# Patient Record
Sex: FEMALE | NOT HISPANIC OR LATINO | URBAN - METROPOLITAN AREA
[De-identification: names, ages, dates, MRNs, and addresses within clinical notes are randomized per-mention and may not be internally consistent; named-entity substitution may affect disease eponyms.]

---
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
## 2021-01-05 ENCOUNTER — OFFICE VISIT (OUTPATIENT)
Dept: INTERNAL MEDICINE | Facility: CLINIC | Age: 21
End: 2021-01-05

## 2021-01-05 VITALS
DIASTOLIC BLOOD PRESSURE: 70 MMHG | WEIGHT: 136 LBS | HEART RATE: 54 BPM | TEMPERATURE: 97.1 F | HEIGHT: 65 IN | OXYGEN SATURATION: 99 % | SYSTOLIC BLOOD PRESSURE: 112 MMHG | BODY MASS INDEX: 22.66 KG/M2 | RESPIRATION RATE: 16 BRPM

## 2021-01-05 DIAGNOSIS — L30.9 ECZEMA, UNSPECIFIED TYPE: Primary | ICD-10-CM

## 2021-01-05 PROCEDURE — 99213 OFFICE O/P EST LOW 20 MIN: CPT | Performed by: INTERNAL MEDICINE

## 2021-01-05 RX ORDER — TRIAMCINOLONE ACETONIDE 0.25 MG/G
CREAM TOPICAL 2 TIMES DAILY PRN
Qty: 30 G | Refills: 0 | Status: SHIPPED | OUTPATIENT
Start: 2021-01-05 | End: 2021-01-12

## 2021-01-05 NOTE — PROGRESS NOTES
"     Office Note      Date: 2021  Patient Name: Nick Yancey  MRN: [id number]  : 2000    Chief Complaint   Patient presents with   • Wound Check     right 4th finger       History of Present Illness: Nick Yancey is a 20 y.o. female who presents for Wound Check (right 4th finger). Has dry, cracked skin on b/l hands. Has tried moisturizing lotion.     Subjective      Review of Systems:   Pertinent review of systems per HPI.    Review of Systems   Constitutional: Negative for activity change, appetite change, chills, diaphoresis, fatigue, fever and unexpected weight change.   HENT: Negative for congestion, dental problem, drooling, ear discharge, ear pain, facial swelling, hearing loss and mouth sores.    Eyes: Negative for pain, discharge and itching.   Respiratory: Negative for apnea, cough, choking, chest tightness and shortness of breath.    Cardiovascular: Negative for chest pain, palpitations and leg swelling.   Gastrointestinal: Negative for abdominal distention, abdominal pain, blood in stool, constipation and diarrhea.   Endocrine: Negative for cold intolerance, heat intolerance, polydipsia and polyuria.   Genitourinary: Negative for difficulty urinating, dysuria, frequency and hematuria.   Skin: Positive for wound. Negative for color change, pallor and rash.   Allergic/Immunologic: Negative for environmental allergies, food allergies and immunocompromised state.   Neurological: Negative for dizziness, weakness and light-headedness.   Psychiatric/Behavioral: Negative for agitation, behavioral problems, confusion, decreased concentration and self-injury. The patient is not nervous/anxious.    All other systems reviewed and are negative.    No Known Allergies    Objective     Physical Exam:  Vital Signs:   Vitals:    21 1520   BP: 112/70   Pulse: 54   Resp: 16   Temp: 97.1 °F (36.2 °C)   TempSrc: Temporal   SpO2: 99%   Weight: 61.7 kg (136 lb)   Height: 165.1 cm (65\")      Body mass index is " 22.63 kg/m².    Physical Exam  Constitutional:       Appearance: Normal appearance. She is normal weight.   HENT:      Head: Normocephalic and atraumatic.   Eyes:      General: No scleral icterus.        Right eye: No discharge.         Left eye: No discharge.      Extraocular Movements: Extraocular movements intact.      Conjunctiva/sclera: Conjunctivae normal.   Skin:     Comments: Dry skin on b/l hands, cracking btwn flexural fold on 4th left digit, no erythema   Neurological:      General: No focal deficit present.      Mental Status: She is alert and oriented to person, place, and time. Mental status is at baseline.   Psychiatric:         Mood and Affect: Mood normal.         Behavior: Behavior normal.         Thought Content: Thought content normal.         Assessment / Plan      Assessment & Plan:    1. Eczema, unspecified type  rx for triamcinolone cream. Given handout on eczema      Heather Cooper MD  01/05/2021     Please note that portions of this note may have been completed with a voice recognition program. Efforts were made to edit the dictations, but occasionally words are mistranscribed.

## 2021-01-27 ENCOUNTER — OFFICE VISIT (OUTPATIENT)
Dept: INTERNAL MEDICINE | Facility: CLINIC | Age: 21
End: 2021-01-27

## 2021-01-27 ENCOUNTER — LAB (OUTPATIENT)
Dept: LAB | Facility: HOSPITAL | Age: 21
End: 2021-01-27

## 2021-01-27 VITALS
WEIGHT: 136 LBS | DIASTOLIC BLOOD PRESSURE: 70 MMHG | RESPIRATION RATE: 14 BRPM | OXYGEN SATURATION: 98 % | HEART RATE: 60 BPM | BODY MASS INDEX: 22.66 KG/M2 | HEIGHT: 65 IN | SYSTOLIC BLOOD PRESSURE: 106 MMHG | TEMPERATURE: 97.1 F

## 2021-01-27 DIAGNOSIS — M25.40 JOINT SWELLING: ICD-10-CM

## 2021-01-27 DIAGNOSIS — M25.40 JOINT SWELLING: Primary | ICD-10-CM

## 2021-01-27 PROCEDURE — 86200 CCP ANTIBODY: CPT

## 2021-01-27 PROCEDURE — 86140 C-REACTIVE PROTEIN: CPT

## 2021-01-27 PROCEDURE — 99214 OFFICE O/P EST MOD 30 MIN: CPT | Performed by: INTERNAL MEDICINE

## 2021-01-27 PROCEDURE — 86431 RHEUMATOID FACTOR QUANT: CPT

## 2021-01-27 PROCEDURE — 86038 ANTINUCLEAR ANTIBODIES: CPT

## 2021-01-27 PROCEDURE — 85652 RBC SED RATE AUTOMATED: CPT

## 2021-01-27 RX ORDER — INDOMETHACIN 50 MG/1
50 CAPSULE ORAL
Qty: 30 CAPSULE | Refills: 0 | Status: SHIPPED | OUTPATIENT
Start: 2021-01-27 | End: 2022-01-09

## 2021-01-27 RX ORDER — METHYLPREDNISOLONE 4 MG/1
TABLET ORAL
Qty: 21 TABLET | Refills: 0 | Status: SHIPPED | OUTPATIENT
Start: 2021-01-27 | End: 2021-02-01

## 2021-01-27 NOTE — PROGRESS NOTES
Office Note      Date: 2021  Patient Name: Nick Yancey  MRN: [id number]  : 2000    Chief Complaint   Patient presents with   • Hand Pain       History of Present Illness: Nick Yancey is a 20 y.o. female who presents for Hand Pain.  Has bilateral hand pain and swelling.  Swelling has improved somewhat since onset.  Has difficulty making handgrip.  Family history of rheumatoid arthritis.  Last seen for eczema given steroid cream which she has not been.  Also has pain in her toes.    Subjective      Review of Systems:   Pertinent review of systems per HPI.    Review of Systems   Constitutional: Negative for activity change, appetite change, chills, diaphoresis, fatigue, fever and unexpected weight change.   HENT: Negative for congestion, dental problem, drooling, ear discharge, ear pain, facial swelling, hearing loss and mouth sores.    Eyes: Negative for pain, discharge and itching.   Respiratory: Negative for apnea, cough, choking, chest tightness and shortness of breath.    Cardiovascular: Negative for chest pain, palpitations and leg swelling.   Gastrointestinal: Negative for abdominal distention, abdominal pain, blood in stool, constipation and diarrhea.   Endocrine: Negative for cold intolerance, heat intolerance, polydipsia and polyuria.   Genitourinary: Negative for difficulty urinating, dysuria, frequency and hematuria.   Musculoskeletal: Positive for joint swelling.   Skin: Negative for color change, pallor, rash and wound.   Allergic/Immunologic: Negative for environmental allergies, food allergies and immunocompromised state.   Neurological: Negative for dizziness, weakness and light-headedness.   Psychiatric/Behavioral: Negative for agitation, behavioral problems, confusion, decreased concentration and self-injury. The patient is not nervous/anxious.    All other systems reviewed and are negative.    No Known Allergies    Objective     Physical Exam:  Vital Signs:   Vitals:    21  "1504   BP: 106/70   Pulse: 60   Resp: 14   Temp: 97.1 °F (36.2 °C)   TempSrc: Temporal   SpO2: 98%   Weight: 61.7 kg (136 lb)   Height: 165.1 cm (65\")      Body mass index is 22.63 kg/m².    Physical Exam  Vitals signs and nursing note reviewed.   Constitutional:       General: She is not in acute distress.     Appearance: Normal appearance. She is normal weight. She is not toxic-appearing.   HENT:      Head: Normocephalic and atraumatic.      Mouth/Throat:      Mouth: Mucous membranes are dry.      Pharynx: Oropharynx is clear.   Eyes:      General: No scleral icterus.        Right eye: No discharge.         Left eye: No discharge.      Extraocular Movements: Extraocular movements intact.      Conjunctiva/sclera: Conjunctivae normal.   Skin:     Comments: Swollen bilateral MCP joints, dry skin palmar surfaces   Neurological:      Mental Status: She is alert.         Assessment / Plan      Assessment & Plan:    1. Joint swelling  Symptoms and exam findings concerning for rheumatoid arthritis.  Will check below labs.  Rx for Medrol Dosepak and indomethacin.  - EMIGDIO With / DsDNA, RNP, Sjogrens A / B, Tejeda; Future  - Cyclic Citrul Peptide Antibody, IgG / IgA; Future  - C-reactive Protein; Future  - Rheumatoid Factor; Future  - Sedimentation Rate; Future      Heather Cooper MD  01/27/2021     Please note that portions of this note may have been completed with a voice recognition program. Efforts were made to edit the dictations, but occasionally words are mistranscribed.  "

## 2021-01-28 LAB
CHROMATIN AB SERPL-ACNC: <10 IU/ML (ref 0–14)
CRP SERPL-MCNC: <0.3 MG/DL (ref 0–0.5)
ERYTHROCYTE [SEDIMENTATION RATE] IN BLOOD: 4 MM/HR (ref 0–20)

## 2021-01-29 LAB — ANA SER QL: NEGATIVE

## 2021-01-30 LAB — CCP IGA+IGG SERPL IA-ACNC: 1 UNITS (ref 0–19)

## 2021-02-08 ENCOUNTER — TELEPHONE (OUTPATIENT)
Dept: INTERNAL MEDICINE | Facility: CLINIC | Age: 21
End: 2021-02-08

## 2021-02-08 DIAGNOSIS — M25.542 ARTHRALGIA OF BOTH HANDS: Primary | ICD-10-CM

## 2021-02-08 DIAGNOSIS — M25.541 ARTHRALGIA OF BOTH HANDS: Primary | ICD-10-CM

## 2021-02-08 NOTE — TELEPHONE ENCOUNTER
Referral to rheumatology placed. Meanwhile can make another OV in clinic if she is unable to get in with rheum soon.     Dr. Heath

## 2021-02-08 NOTE — TELEPHONE ENCOUNTER
PT STATES THAT SHE DOESN'T HAVE FULL MOBILITY IN TWO OF HER FINGERS ONE ON RIGHT HAND AND MIDDLE FINGER ON THE LEFT HAND.  PT STATES THAT IT IS NOT A 100%. THERE IS  NO PAIN.     PT WOULD LIKE TO BE ADVISED ON WHAT TO DO FROM HERE.      PLEASE ADVISE  [phone]

## 2021-03-30 ENCOUNTER — TELEPHONE (OUTPATIENT)
Dept: INTERNAL MEDICINE | Facility: CLINIC | Age: 21
End: 2021-03-30

## 2021-03-30 NOTE — TELEPHONE ENCOUNTER
Caller: Nick Yancey    Relationship: Self    Best call back number: [phone]    What is the medical concern/diagnosis: MOBILITY IN RIGHT HAND AND LEFT HAND FINGERS    What specialty or service is being requested: RHEUMATOLOGY    What is the provider, practice or medical service name: N/A    What is the office location: N/A    What is the office phone number: N/A    Any additional details: PATIENT STATED THAT SHE WOULD LIKE TO KNOW IF THIS REFERRAL IS COVERED UNDER INSURANCE     PLEASE ADVISE

## 2021-10-02 PROCEDURE — U0004 COV-19 TEST NON-CDC HGH THRU: HCPCS | Performed by: FAMILY MEDICINE

## 2022-01-09 PROCEDURE — U0004 COV-19 TEST NON-CDC HGH THRU: HCPCS | Performed by: NURSE PRACTITIONER

## 2022-01-12 ENCOUNTER — TELEPHONE (OUTPATIENT)
Dept: URGENT CARE | Facility: CLINIC | Age: 22
End: 2022-01-12

## 2023-09-07 ENCOUNTER — OFFICE VISIT (OUTPATIENT)
Dept: INTERNAL MEDICINE | Facility: CLINIC | Age: 23
End: 2023-09-07
Payer: COMMERCIAL

## 2023-09-07 ENCOUNTER — HOSPITAL ENCOUNTER (OUTPATIENT)
Dept: GENERAL RADIOLOGY | Facility: HOSPITAL | Age: 23
Discharge: HOME | End: 2023-09-07
Admitting: NURSE PRACTITIONER
Payer: COMMERCIAL

## 2023-09-07 VITALS
SYSTOLIC BLOOD PRESSURE: 118 MMHG | WEIGHT: 142 LBS | OXYGEN SATURATION: 98 % | TEMPERATURE: 97.3 F | DIASTOLIC BLOOD PRESSURE: 78 MMHG | HEART RATE: 79 BPM | BODY MASS INDEX: 22.92 KG/M2

## 2023-09-07 DIAGNOSIS — L03.012 CELLULITIS OF FINGER OF LEFT HAND: Primary | ICD-10-CM

## 2023-09-07 PROCEDURE — 1159F MED LIST DOCD IN RCRD: CPT | Performed by: NURSE PRACTITIONER

## 2023-09-07 PROCEDURE — 1160F RVW MEDS BY RX/DR IN RCRD: CPT | Performed by: NURSE PRACTITIONER

## 2023-09-07 PROCEDURE — 73140 X-RAY EXAM OF FINGER(S): CPT

## 2023-09-07 PROCEDURE — 99213 OFFICE O/P EST LOW 20 MIN: CPT | Performed by: NURSE PRACTITIONER

## 2023-09-07 RX ORDER — CEPHALEXIN 500 MG/1
500 CAPSULE ORAL 2 TIMES DAILY
Qty: 10 CAPSULE | Refills: 0 | Status: SHIPPED | OUTPATIENT
Start: 2023-09-07

## 2023-09-07 NOTE — ASSESSMENT & PLAN NOTE
Cellulitis of the left middle finger.     Keflex prescribed.  Educational material distributed.  Will order x-ray for suspicion of foreign body in finger.    Follow-up as needed

## 2023-09-07 NOTE — PROGRESS NOTES
"Chief Complaint  Hand Pain (Finger prick has made finger swollen)    Subjective      History of Present Illness  Nick is a 23 y.o. female who presents to the clinic today for evaluation of left middle finger pain. Onset was  1 week ago after a finger stick . The pain is mild, worsens with touch or picking something up, and is relieved by nothing. There is no associated numbness, tingling, weakness in finger/hand. Evaluation to date: She spoke to [facility] when symptoms developed.  They told her it did not look infected.  She was also told a rare occurrence of a piece of the needle breaking off in the skin, but they would not now because they cannot see the needle and the lancet.  Treatment to date:  She was told by [facility] to soak her finger in warm, soapy water and then cleaned with an alcohol swab.  She did soak it 3-4 times over the weekend with no improvement.  She says in the last 24 hours some swelling has decreased. .    The following portions of the patient's history were reviewed and updated as appropriate: allergies, current medications, past family history, past medical history, past social history, past surgical history, and problem list.    Review of Systems  Pertinent items are noted in HPI.          Objective   Vital Signs:  /78 (BP Location: Left arm, Patient Position: Sitting, Cuff Size: Adult)   Pulse 79   Temp 97.3 °F (36.3 °C) (Temporal)   Wt 64.4 kg (142 lb)   SpO2 98%   BMI 22.92 kg/m²   Estimated body mass index is 22.92 kg/m² as calculated from the following:    Height as of 6/22/23: 167.6 cm (66\").    Weight as of this encounter: 64.4 kg (142 lb).    BMI is within normal parameters. No other follow-up for BMI required.      Physical Exam  Vitals reviewed.   Constitutional:       General: She is not in acute distress.  HENT:      Head: Normocephalic and atraumatic.   Skin:     General: Skin is warm and dry.          Neurological:      General: No focal deficit present.      Mental Status: She " is alert.   Psychiatric:         Mood and Affect: Mood normal.         Behavior: Behavior normal.         Thought Content: Thought content normal.         Judgment: Judgment normal.        Result Review                    Assessment and Plan  Diagnoses and all orders for this visit:    1. Cellulitis of finger of left hand (Primary)  Assessment & Plan:  Cellulitis of the left middle finger.     Keflex prescribed.  Educational material distributed.  Will order x-ray for suspicion of foreign body in finger.    Follow-up as needed         Orders:  -     XR Finger 2+ View Left (In Office)  -     cephalexin (Keflex) 500 MG capsule; Take 1 capsule by mouth 2 (Two) Times a Day.  Dispense: 10 capsule; Refill: 0               Follow Up  Return if symptoms worsen or fail to improve.  Patient was given instructions and counseling regarding her condition or for health maintenance advice. Please see specific information pulled into the AVS if appropriate.    Part of this note may be an electronic transcription/translation of spoken language to printed text using the Dragon Dictation System.

## 2025-07-11 ENCOUNTER — OFFICE VISIT (OUTPATIENT)
Dept: FAMILY MEDICINE CLINIC | Facility: CLINIC | Age: 25
End: 2025-07-11
Payer: COMMERCIAL

## 2025-07-11 ENCOUNTER — PATIENT MESSAGE (OUTPATIENT)
Dept: FAMILY MEDICINE CLINIC | Facility: CLINIC | Age: 25
End: 2025-07-11

## 2025-07-11 VITALS
TEMPERATURE: 98 F | HEIGHT: 66 IN | WEIGHT: 141.4 LBS | DIASTOLIC BLOOD PRESSURE: 76 MMHG | SYSTOLIC BLOOD PRESSURE: 120 MMHG | BODY MASS INDEX: 22.73 KG/M2 | OXYGEN SATURATION: 100 % | HEART RATE: 58 BPM

## 2025-07-11 DIAGNOSIS — Z00.00 ENCOUNTER FOR ANNUAL HEALTH EXAMINATION: Primary | ICD-10-CM

## 2025-07-11 DIAGNOSIS — Z11.59 ENCOUNTER FOR HEPATITIS C SCREENING TEST FOR LOW RISK PATIENT: ICD-10-CM

## 2025-07-11 DIAGNOSIS — D64.9 ANEMIA, UNSPECIFIED TYPE: ICD-10-CM

## 2025-07-11 DIAGNOSIS — G89.29 CHRONIC LEFT SHOULDER PAIN: ICD-10-CM

## 2025-07-11 DIAGNOSIS — M25.512 CHRONIC LEFT SHOULDER PAIN: Primary | ICD-10-CM

## 2025-07-11 DIAGNOSIS — Z12.4 SCREENING FOR CERVICAL CANCER: ICD-10-CM

## 2025-07-11 DIAGNOSIS — G89.29 CHRONIC LEFT SHOULDER PAIN: Primary | ICD-10-CM

## 2025-07-11 DIAGNOSIS — M25.512 CHRONIC LEFT SHOULDER PAIN: ICD-10-CM

## 2025-07-11 DIAGNOSIS — R10.32 LEFT GROIN PAIN: ICD-10-CM

## 2025-07-11 PROBLEM — L03.012 CELLULITIS OF FINGER OF LEFT HAND: Status: RESOLVED | Noted: 2023-09-07 | Resolved: 2025-07-11

## 2025-07-11 RX ORDER — FERROUS SULFATE 324(65)MG
324 TABLET, DELAYED RELEASE (ENTERIC COATED) ORAL DAILY PRN
COMMUNITY

## 2025-07-11 NOTE — ASSESSMENT & PLAN NOTE
Suspect this is likely secondary to tendinopathy  Recommend discussing this with PT as well.  Recommended activity modification and may use over-the-counter analgesics/NSAIDs if needed.

## 2025-07-11 NOTE — PROGRESS NOTES
Female Physical Note      Patient Name: Nick Yancey  : 2000   MRN: [id number]     Chief Complaint:    Chief Complaint   Patient presents with   • Establish Care       History of Present Illness  The patient presents to establish care and for annual physical.    She has never undergone a Pap smear or consulted a gynecologist. Her diet is balanced, consisting of rice, chicken, meat, and vegetables. She ensures adequate protein intake and sleeps between 6 to 10 hours daily. She is currently studying and plans to start working soon. She will be attending school online for [employer] in 2025. She has a history of iron deficiency anemia. Her last dental visit was on 04/15/2025, and she is due for an eye examination, her most recent being in 2024.    She has been experiencing hip/groin pain for several years, which she attributes to overuse from running and working out. Her primary form of exercise is weightlifting, but she began running slightly over a month ago. Interestingly, her hip pain did not worsen with the onset of running; in fact, it improved. The pain is most pronounced during lower body exercises. She has not sought physical therapy for this issue but is interested in a referral.    She also reports weakness in her left shoulder, which becomes painful during workouts, particularly when bench pressing. This pain has been present since last year and is only noticeable during heavy lifting towards the end of her workout.          Subjective      Past Medical History, Social History, Family History and Care Team were all reviewed with patient and updated as appropriate.     Medications:     Current Outpatient Medications:   •  ferrous sulfate 324 (65 Fe) MG tablet delayed-release EC tablet, Take 1 tablet by mouth Daily As Needed., Disp: , Rfl:     Allergies:   No Known Allergies    Health Maintenance   Topic Date Due   • HPV VACCINES (1 - 3-dose series) Never done   • TDAP/TD VACCINES (1 -  "Tdap) Never done   • HEPATITIS C SCREENING  Never done   • PAP SMEAR  Never done   • ANNUAL PHYSICAL  06/22/2024   • COVID-19 Vaccine (3 - 2024-25 season) 10/25/2025 (Originally 9/1/2024)   • INFLUENZA VACCINE  10/01/2025   • Pneumococcal Vaccine 0-49  Aged Out   • MENINGOCOCCAL B VACCINE  Aged Out       PHQ-2 Depression Screening  Little interest or pleasure in doing things? Not at all   Feeling down, depressed, or hopeless? Not at all   PHQ-2 Total Score 0       Intimate partner violence: (Screen on initial visit, pregnant women, women with injuries, older adult with injury or evidence of neglect):  Violence can be a problem in many people's lives, so I now ask every patient about trauma or abuse they may have experienced in a relationship.  Stress/Safety - Do you feel safe in your relationship?  Afraid/Abused - Have you ever been in a relationship where you were threatened, hurt, or afraid?  Friend/Family - Are your friends aware you have been hurt?  Emergency Plan - Do you have a safe place to go and the resources you need in an emergency?    Osteoporosis:   Ost menopausal women < 65 with RF (advancing age, previous fracture, glucocorticoid therapy, parental hip fracture, low body weight, current cigarette smoking, excessive alcohol consumption, rheumatoid arthritis, secondary osteoporosis [hypogonadism/premature menopause, malabsorption, chronic liver disease, IBD]).  All women 65 or older    Objective     Physical Exam:  Vital Signs:   Vitals:    07/11/25 0754   BP: 120/76   BP Location: Left arm   Patient Position: Sitting   Cuff Size: Small Adult   Pulse: 58   Temp: 98 °F (36.7 °C)   TempSrc: Infrared   SpO2: 100%   Weight: 64.1 kg (141 lb 6.4 oz)   Height: 167.4 cm (65.9\")     Body mass index is 22.89 kg/m².     Physical Exam  Constitutional:       General: She is not in acute distress.     Appearance: She is not ill-appearing.   HENT:      Head: Normocephalic.   Cardiovascular:      Rate and Rhythm: Normal " rate and regular rhythm.      Heart sounds: No murmur heard.  Pulmonary:      Effort: Pulmonary effort is normal. No respiratory distress.      Breath sounds: Normal breath sounds.   Musculoskeletal:         General: Normal range of motion.      Comments: Increased pain of left hip/groin with internal rotation and flexion  Left shoulder with slight weakness against resistance, tenderness to anterior shoulder, +empty can   Skin:     General: Skin is warm.   Neurological:      General: No focal deficit present.      Mental Status: She is alert.   Psychiatric:         Mood and Affect: Mood normal.         Procedures    Assessment / Plan      Assessment/Plan:          Diagnoses and all orders for this visit:    1. Encounter for annual health examination (Primary)  Assessment & Plan:  The patient is here for health maintenance visit.  Currently, the patient consumes a healthy diet and has an adequate exercise regimen.  Screening lab work is ordered.  Immunizations were reviewed today-is going to work on obtaining vaccine records from previous pediatrician.  Advice and education was given regarding nutrition, aerobic exercise, routine dental evaluations, routine eye exams, reproductive health, cardiovascular risk reduction, sunscreen use, self skin examination (annual dermatology evaluations) and seatbelt use (general overall safety).  Further recommendations will be given if needed after lab evaluation.  Annual wellness evaluation is recommended.    Orders:  -     CBC & Differential; Future  -     Lipid Panel; Future  -     Comprehensive Metabolic Panel; Future  -     Vitamin D,25-Hydroxy; Future    2. Left groin pain  Assessment & Plan:  Patient with chronic left groin pain  Will obtain x-ray imaging today  Referral to PT for further evaluation and management    Orders:  -     XR Hip With or Without Pelvis 2 - 3 View Left; Future  -     Ambulatory Referral to Physical Therapy for Evaluation & Treatment    3. Screening  for cervical cancer  -     Ambulatory Referral to Obstetrics / Gynecology    4. Anemia, unspecified type  Assessment & Plan:  Reported history of MICHELLE  Will obtain labs including iron studies today    Orders:  -     CBC & Differential; Future  -     Iron Profile w/o Ferritin; Future  -     Ferritin; Future    5. Encounter for hepatitis C screening test for low risk patient  -     HCV Antibody Rfx To Qnt PCR; Future    6. Chronic left shoulder pain  Assessment & Plan:  Suspect this is likely secondary to tendinopathy  Recommend discussing this with PT as well.  Recommended activity modification and may use over-the-counter analgesics/NSAIDs if needed.    Orders:  -     XR Shoulder 2+ View Left; Future         Patient or patient representative verbalized consent for the use of Ambient Listening during the visit with  Ros aElena Corado PA-C for chart documentation. 7/11/2025  08:06 EDT    Follow Up:   Return in about 1 year (around 7/11/2026) for Annual physical, Fasting Labs.    Healthcare Maintenance:   Counseling provided on routine screenings, maintaining healthy diet and lifestyle.   Nick Yancey voices understanding and acceptance of this advice and will call back with any further questions or concerns. AVS with preventive healthcare tips printed for patient.     Rosa Elena Corado PA-C    [facility] [place]

## 2025-07-11 NOTE — ASSESSMENT & PLAN NOTE
The patient is here for health maintenance visit.  Currently, the patient consumes a healthy diet and has an adequate exercise regimen.  Screening lab work is ordered.  Immunizations were reviewed today-is going to work on obtaining vaccine records from previous pediatrician.  Advice and education was given regarding nutrition, aerobic exercise, routine dental evaluations, routine eye exams, reproductive health, cardiovascular risk reduction, sunscreen use, self skin examination (annual dermatology evaluations) and seatbelt use (general overall safety).  Further recommendations will be given if needed after lab evaluation.  Annual wellness evaluation is recommended.

## 2025-07-11 NOTE — ASSESSMENT & PLAN NOTE
Patient with chronic left groin pain  Will obtain x-ray imaging today  Referral to PT for further evaluation and management